# Patient Record
Sex: FEMALE | Race: WHITE | NOT HISPANIC OR LATINO
[De-identification: names, ages, dates, MRNs, and addresses within clinical notes are randomized per-mention and may not be internally consistent; named-entity substitution may affect disease eponyms.]

---

## 2020-03-06 ENCOUNTER — APPOINTMENT (OUTPATIENT)
Dept: CARDIOLOGY | Facility: CLINIC | Age: 60
End: 2020-03-06

## 2023-01-04 ENCOUNTER — APPOINTMENT (OUTPATIENT)
Dept: OBGYN | Facility: CLINIC | Age: 63
End: 2023-01-04
Payer: COMMERCIAL

## 2023-01-04 VITALS
BODY MASS INDEX: 47.91 KG/M2 | DIASTOLIC BLOOD PRESSURE: 78 MMHG | SYSTOLIC BLOOD PRESSURE: 126 MMHG | WEIGHT: 270.4 LBS | HEIGHT: 63 IN

## 2023-01-04 DIAGNOSIS — E11.9 TYPE 2 DIABETES MELLITUS W/OUT COMPLICATIONS: ICD-10-CM

## 2023-01-04 DIAGNOSIS — Z86.79 PERSONAL HISTORY OF OTHER DISEASES OF THE CIRCULATORY SYSTEM: ICD-10-CM

## 2023-01-04 DIAGNOSIS — Z78.9 OTHER SPECIFIED HEALTH STATUS: ICD-10-CM

## 2023-01-04 DIAGNOSIS — I10 ESSENTIAL (PRIMARY) HYPERTENSION: ICD-10-CM

## 2023-01-04 DIAGNOSIS — Z11.51 ENCOUNTER FOR SCREENING FOR HUMAN PAPILLOMAVIRUS (HPV): ICD-10-CM

## 2023-01-04 DIAGNOSIS — Z01.419 ENCOUNTER FOR GYNECOLOGICAL EXAMINATION (GENERAL) (ROUTINE) W/OUT ABNORMAL FINDINGS: ICD-10-CM

## 2023-01-04 DIAGNOSIS — Z12.39 ENCOUNTER FOR OTHER SCREENING FOR MALIGNANT NEOPLASM OF BREAST: ICD-10-CM

## 2023-01-04 DIAGNOSIS — K90.0 CELIAC DISEASE: ICD-10-CM

## 2023-01-04 DIAGNOSIS — Z82.49 FAMILY HISTORY OF ISCHEMIC HEART DISEASE AND OTHER DISEASES OF THE CIRCULATORY SYSTEM: ICD-10-CM

## 2023-01-04 DIAGNOSIS — N76.0 ACUTE VAGINITIS: ICD-10-CM

## 2023-01-04 DIAGNOSIS — Z83.3 FAMILY HISTORY OF DIABETES MELLITUS: ICD-10-CM

## 2023-01-04 DIAGNOSIS — Z12.4 ENCOUNTER FOR SCREENING FOR MALIGNANT NEOPLASM OF CERVIX: ICD-10-CM

## 2023-01-04 PROCEDURE — 99386 PREV VISIT NEW AGE 40-64: CPT

## 2023-01-04 PROCEDURE — 99202 OFFICE O/P NEW SF 15 MIN: CPT | Mod: 25

## 2023-01-04 RX ORDER — FLUCONAZOLE 150 MG/1
150 TABLET ORAL
Qty: 2 | Refills: 1 | Status: ACTIVE | COMMUNITY
Start: 2023-01-04 | End: 1900-01-01

## 2023-01-04 NOTE — REVIEW OF SYSTEMS
Regarding: hard time shallowing/not able to eat  ----- Message from Susie Tapia sent at 5/3/2020 12:09 PM CDT -----  Patient Name: Katy Diaz    Specialist or PCP Full Name: aakash henry    Pregnant (If Yes, how long?): no    Symptoms: hard time shallowing/not able to eat    Do you or any of your household members have the following symptoms:  Fever >100.4#F or >38.0#C: No    New or worsening cough, shortness of breath, or sore throat: No     New onset of nausea, vomiting or diarrhea: No    New onset of loss of taste or smell, chills, repeated shaking with chills, muscle pain, or headache: No    Have you or a household member tested positive for COVID-19 in the last 14 days?: No    Call Back #: [phone]    Call Center Account #: [id number]    Please update the Demographics section with the patients permanent resident address      [Patient Intake Form Reviewed] : Patient intake form was reviewed

## 2023-01-07 PROBLEM — Z86.79 HISTORY OF CHRONIC HYPERTENSION: Status: RESOLVED | Noted: 2023-01-07 | Resolved: 2023-01-07

## 2023-01-07 PROBLEM — K90.0 ADULT CELIAC DISEASE: Status: RESOLVED | Noted: 2023-01-07 | Resolved: 2023-01-07

## 2023-01-07 PROBLEM — E11.9 DIABETES MELLITUS: Status: ACTIVE | Noted: 2023-01-07

## 2023-01-07 PROBLEM — I10 CHRONIC HYPERTENSION: Status: ACTIVE | Noted: 2023-01-07

## 2023-01-07 NOTE — HISTORY OF PRESENT ILLNESS
[postmenopausal] : postmenopausal [N] : Patient does not use contraception [Y] : Positive pregnancy history [Menarche Age: ____] : age at menarche was [unfilled] [Post-Menopause, No Sxs] : post-menopausal, currently without symptoms [No] : Patient does not have concerns regarding sex [Currently Active] : currently active [Patient reported mammogram was normal] : Patient reported mammogram was normal [Patient reported PAP Smear was normal] : Patient reported PAP Smear was normal [Mammogramdate] : 2014 [PapSmeardate] : 2014 [LMPDate] : 2006 [PGHxTotal] : 3 [[facility] Services[facility]Full[facility]] : 2 [PGxPremature] : 1 [[facility] Center[facility]] : 3 [FreeTextEntry1] : 2006

## 2023-01-07 NOTE — PHYSICAL EXAM
[Appropriately responsive] : appropriately responsive [Alert] : alert [No Acute Distress] : no acute distress [Soft] : soft [Non-tender] : non-tender [Non-distended] : non-distended [No HSM] : No HSM [No Lesions] : no lesions [No Mass] : no mass [Oriented x3] : oriented x3 [Examination Of The Breasts] : a normal appearance [No Masses] : no breast masses were palpable [Labia Majora] : normal [Labia Minora] : normal [Discharge] : a  ~M vaginal discharge was present [Normal] : normal [Uterine Adnexae] : normal [Chaperone Present] : A chaperone was present in the examining room during all aspects of the physical examination [No Lymphadenopathy] : no lymphadenopathy [Vulvar Atrophy] : vulvar atrophy [Atrophy] : atrophy [FreeTextEntry1] : GEORGINA Verduzco  [FreeTextEntry3] : mobile thyroid, no masses, no nodules [FreeTextEntry6] : No cervical or axillary lymphadenopathy. [FreeTextEntry2] : vulvar yeast noted

## 2023-01-07 NOTE — END OF VISIT
[Time Spent: ___ minutes] : I have spent [unfilled] minutes of time on the encounter. [FreeTextEntry3] : I Meredith Knowles wrote this note acting  as a scribe for Dr. Vandaan Reardon MD on Jan 4th, 2023. I Dr. Vandana Reardon MD, ordering physician, have read and attest that all the information, medical decision making and discharge instructions within are true and accurate on 01/04/2023.

## 2023-01-15 LAB
CANDIDA VAG CYTO: NOT DETECTED
CYTOLOGY CVX/VAG DOC THIN PREP: NORMAL
G VAGINALIS+PREV SP MTYP VAG QL MICRO: NOT DETECTED
HPV HIGH+LOW RISK DNA PNL CVX: NOT DETECTED
T VAGINALIS VAG QL WET PREP: NOT DETECTED

## 2023-03-07 ENCOUNTER — NON-APPOINTMENT (OUTPATIENT)
Age: 63
End: 2023-03-07

## 2023-04-12 ENCOUNTER — NON-APPOINTMENT (OUTPATIENT)
Age: 63
End: 2023-04-12

## 2023-05-03 ENCOUNTER — NON-APPOINTMENT (OUTPATIENT)
Age: 63
End: 2023-05-03

## 2023-06-05 ENCOUNTER — NON-APPOINTMENT (OUTPATIENT)
Age: 63
End: 2023-06-05

## 2023-07-03 ENCOUNTER — NON-APPOINTMENT (OUTPATIENT)
Age: 63
End: 2023-07-03

## 2023-07-10 ENCOUNTER — APPOINTMENT (OUTPATIENT)
Dept: MAMMOGRAPHY | Facility: CLINIC | Age: 63
End: 2023-07-10
Payer: COMMERCIAL

## 2023-07-10 ENCOUNTER — OUTPATIENT (OUTPATIENT)
Dept: OUTPATIENT SERVICES | Facility: HOSPITAL | Age: 63
LOS: 1 days | End: 2023-07-10
Payer: COMMERCIAL

## 2023-07-10 ENCOUNTER — RESULT REVIEW (OUTPATIENT)
Age: 63
End: 2023-07-10

## 2023-07-10 DIAGNOSIS — Z12.31 ENCOUNTER FOR SCREENING MAMMOGRAM FOR MALIGNANT NEOPLASM OF BREAST: ICD-10-CM

## 2023-07-10 PROCEDURE — 77067 SCR MAMMO BI INCL CAD: CPT | Mod: 26

## 2023-07-10 PROCEDURE — 77063 BREAST TOMOSYNTHESIS BI: CPT

## 2023-07-10 PROCEDURE — 77067 SCR MAMMO BI INCL CAD: CPT

## 2023-07-10 PROCEDURE — 77063 BREAST TOMOSYNTHESIS BI: CPT | Mod: 26

## 2023-07-20 ENCOUNTER — APPOINTMENT (OUTPATIENT)
Dept: MAMMOGRAPHY | Facility: CLINIC | Age: 63
End: 2023-07-20

## 2023-07-20 ENCOUNTER — APPOINTMENT (OUTPATIENT)
Dept: ULTRASOUND IMAGING | Facility: CLINIC | Age: 63
End: 2023-07-20

## 2023-09-06 ENCOUNTER — APPOINTMENT (OUTPATIENT)
Dept: MAMMOGRAPHY | Facility: CLINIC | Age: 63
End: 2023-09-06

## 2023-09-06 ENCOUNTER — APPOINTMENT (OUTPATIENT)
Dept: ULTRASOUND IMAGING | Facility: CLINIC | Age: 63
End: 2023-09-06

## 2023-09-11 ENCOUNTER — NON-APPOINTMENT (OUTPATIENT)
Age: 63
End: 2023-09-11

## 2023-10-23 ENCOUNTER — RESULT REVIEW (OUTPATIENT)
Age: 63
End: 2023-10-23

## 2023-10-23 ENCOUNTER — OUTPATIENT (OUTPATIENT)
Dept: OUTPATIENT SERVICES | Facility: HOSPITAL | Age: 63
LOS: 1 days | End: 2023-10-23
Payer: COMMERCIAL

## 2023-10-23 ENCOUNTER — APPOINTMENT (OUTPATIENT)
Dept: MAMMOGRAPHY | Facility: CLINIC | Age: 63
End: 2023-10-23
Payer: COMMERCIAL

## 2023-10-23 ENCOUNTER — APPOINTMENT (OUTPATIENT)
Dept: ULTRASOUND IMAGING | Facility: CLINIC | Age: 63
End: 2023-10-23
Payer: COMMERCIAL

## 2023-10-23 DIAGNOSIS — R92.8 OTHER ABNORMAL AND INCONCLUSIVE FINDINGS ON DIAGNOSTIC IMAGING OF BREAST: ICD-10-CM

## 2023-10-23 PROCEDURE — 76641 ULTRASOUND BREAST COMPLETE: CPT | Mod: 26,LT

## 2023-10-23 PROCEDURE — G0279: CPT | Mod: 26

## 2023-10-23 PROCEDURE — 76642 ULTRASOUND BREAST LIMITED: CPT | Mod: 26,RT

## 2023-10-23 PROCEDURE — 77066 DX MAMMO INCL CAD BI: CPT | Mod: 26

## 2023-10-23 PROCEDURE — G0279: CPT

## 2023-10-23 PROCEDURE — 76642 ULTRASOUND BREAST LIMITED: CPT

## 2023-10-23 PROCEDURE — 77066 DX MAMMO INCL CAD BI: CPT

## 2023-10-23 PROCEDURE — 76641 ULTRASOUND BREAST COMPLETE: CPT

## 2024-07-01 ENCOUNTER — NON-APPOINTMENT (OUTPATIENT)
Age: 64
End: 2024-07-01

## 2024-07-01 ENCOUNTER — APPOINTMENT (OUTPATIENT)
Dept: OBGYN | Facility: CLINIC | Age: 64
End: 2024-07-01
Payer: COMMERCIAL

## 2024-07-01 VITALS
BODY MASS INDEX: 45.18 KG/M2 | DIASTOLIC BLOOD PRESSURE: 80 MMHG | WEIGHT: 255 LBS | HEIGHT: 63 IN | SYSTOLIC BLOOD PRESSURE: 128 MMHG

## 2024-07-01 DIAGNOSIS — N76.2 ACUTE VULVITIS: ICD-10-CM

## 2024-07-01 DIAGNOSIS — R82.90 UNSPECIFIED ABNORMAL FINDINGS IN URINE: ICD-10-CM

## 2024-07-01 LAB
APPEARANCE: CLEAR
BILIRUBIN URINE: NEGATIVE
BLOOD URINE: NEGATIVE
COLOR: YELLOW
GLUCOSE QUALITATIVE U: NEGATIVE
KETONES URINE: NEGATIVE
LEUKOCYTE ESTERASE URINE: NEGATIVE
NITRITE URINE: NEGATIVE
PH URINE: 5.5
PROTEIN URINE: NEGATIVE
SPECIFIC GRAVITY URINE: 1.01
UROBILINOGEN URINE: 0.2 (ref 0.2–?)

## 2024-07-01 PROCEDURE — 99213 OFFICE O/P EST LOW 20 MIN: CPT

## 2024-07-01 RX ORDER — CLOTRIMAZOLE AND BETAMETHASONE DIPROPIONATE 10; .5 MG/G; MG/G
1-0.05 CREAM TOPICAL TWICE DAILY
Qty: 1 | Refills: 0 | Status: ACTIVE | COMMUNITY
Start: 2024-07-01 | End: 1900-01-01

## 2024-07-01 RX ORDER — FLUCONAZOLE 150 MG/1
150 TABLET ORAL
Qty: 4 | Refills: 1 | Status: ACTIVE | COMMUNITY
Start: 2024-07-01 | End: 1900-01-01

## 2024-07-06 LAB
BACTERIA UR CULT: NORMAL
CANDIDA VAG CYTO: NOT DETECTED
G VAGINALIS+PREV SP MTYP VAG QL MICRO: NOT DETECTED
T VAGINALIS VAG QL WET PREP: NOT DETECTED

## 2024-07-22 ENCOUNTER — APPOINTMENT (OUTPATIENT)
Dept: OBGYN | Facility: CLINIC | Age: 64
End: 2024-07-22

## 2024-08-12 ENCOUNTER — APPOINTMENT (OUTPATIENT)
Dept: OBGYN | Facility: CLINIC | Age: 64
End: 2024-08-12

## 2024-10-07 ENCOUNTER — OFFICE (OUTPATIENT)
Dept: URBAN - METROPOLITAN AREA CLINIC 12 | Facility: CLINIC | Age: 64
Setting detail: OPHTHALMOLOGY
End: 2024-10-07
Payer: COMMERCIAL

## 2024-10-07 DIAGNOSIS — H90.3: ICD-10-CM

## 2024-10-07 PROCEDURE — 92557 COMPREHENSIVE HEARING TEST: CPT

## 2024-10-07 PROCEDURE — 92567 TYMPANOMETRY: CPT

## 2024-10-07 PROCEDURE — V5261 HEARING AID, DIGIT, BIN, BTE: HCPCS

## 2024-11-26 ENCOUNTER — OFFICE (OUTPATIENT)
Dept: URBAN - METROPOLITAN AREA CLINIC 12 | Facility: CLINIC | Age: 64
Setting detail: OPHTHALMOLOGY
End: 2024-11-26

## 2024-11-26 DIAGNOSIS — H90.3: ICD-10-CM

## 2024-11-26 PROCEDURE — HAD HEARING AID DISPENSE

## 2024-12-03 ENCOUNTER — OFFICE (OUTPATIENT)
Dept: URBAN - METROPOLITAN AREA CLINIC 12 | Facility: CLINIC | Age: 64
Setting detail: OPHTHALMOLOGY
End: 2024-12-03

## 2024-12-03 DIAGNOSIS — H90.3: ICD-10-CM

## 2024-12-03 PROCEDURE — 92593 HEARING AID CHECK; BINAURAL: CPT

## 2024-12-18 ENCOUNTER — OFFICE (OUTPATIENT)
Dept: URBAN - METROPOLITAN AREA CLINIC 12 | Facility: CLINIC | Age: 64
Setting detail: OPHTHALMOLOGY
End: 2024-12-18

## 2024-12-18 DIAGNOSIS — H90.3: ICD-10-CM

## 2024-12-18 PROCEDURE — CANCEL CANCEL

## 2025-01-06 ENCOUNTER — OFFICE (OUTPATIENT)
Dept: URBAN - METROPOLITAN AREA CLINIC 12 | Facility: CLINIC | Age: 65
Setting detail: OPHTHALMOLOGY
End: 2025-01-06

## 2025-01-06 DIAGNOSIS — H90.3: ICD-10-CM

## 2025-01-06 PROCEDURE — NO SHOW FE NO SHOW FEE

## 2025-01-23 ENCOUNTER — OFFICE (OUTPATIENT)
Dept: URBAN - METROPOLITAN AREA CLINIC 12 | Facility: CLINIC | Age: 65
Setting detail: OPHTHALMOLOGY
End: 2025-01-23

## 2025-01-23 ENCOUNTER — OFFICE (OUTPATIENT)
Dept: URBAN - METROPOLITAN AREA CLINIC 38 | Facility: CLINIC | Age: 65
Setting detail: OPHTHALMOLOGY
End: 2025-01-23
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
## 2025-01-23 DIAGNOSIS — H90.3: ICD-10-CM

## 2025-01-23 PROCEDURE — R/S RESCHEDULED

## 2025-01-23 PROCEDURE — NO SHOW FE NO SHOW FEE

## 2025-03-05 ENCOUNTER — OFFICE (OUTPATIENT)
Dept: URBAN - METROPOLITAN AREA CLINIC 12 | Facility: CLINIC | Age: 65
Setting detail: OPHTHALMOLOGY
End: 2025-03-05

## 2025-03-05 DIAGNOSIS — H90.3: ICD-10-CM

## 2025-03-05 PROCEDURE — 92593 HEARING AID CHECK; BINAURAL: CPT

## 2025-03-31 ENCOUNTER — APPOINTMENT (OUTPATIENT)
Dept: OBGYN | Facility: CLINIC | Age: 65
End: 2025-03-31
Payer: COMMERCIAL

## 2025-03-31 VITALS
SYSTOLIC BLOOD PRESSURE: 122 MMHG | HEIGHT: 63 IN | WEIGHT: 245 LBS | BODY MASS INDEX: 43.41 KG/M2 | DIASTOLIC BLOOD PRESSURE: 80 MMHG

## 2025-03-31 DIAGNOSIS — N90.89 OTHER SPECIFIED NONINFLAMMATORY DISORDERS OF VULVA AND PERINEUM: ICD-10-CM

## 2025-03-31 DIAGNOSIS — Z12.4 ENCOUNTER FOR SCREENING FOR MALIGNANT NEOPLASM OF CERVIX: ICD-10-CM

## 2025-03-31 DIAGNOSIS — Z01.419 ENCOUNTER FOR GYNECOLOGICAL EXAMINATION (GENERAL) (ROUTINE) W/OUT ABNORMAL FINDINGS: ICD-10-CM

## 2025-03-31 DIAGNOSIS — R73.9 HYPERGLYCEMIA, UNSPECIFIED: ICD-10-CM

## 2025-03-31 DIAGNOSIS — Z12.39 ENCOUNTER FOR OTHER SCREENING FOR MALIGNANT NEOPLASM OF BREAST: ICD-10-CM

## 2025-03-31 DIAGNOSIS — N76.2 ACUTE VULVITIS: ICD-10-CM

## 2025-03-31 DIAGNOSIS — L29.2 PRURITUS VULVAE: ICD-10-CM

## 2025-03-31 DIAGNOSIS — Z91.199 PATIENT'S NONCOMPLIANCE WITH OTHER MEDICAL TREATMENT AND REGIMEN DUE TO UNSPECIFIED REASON: ICD-10-CM

## 2025-03-31 DIAGNOSIS — R82.90 UNSPECIFIED ABNORMAL FINDINGS IN URINE: ICD-10-CM

## 2025-03-31 LAB
APPEARANCE: CLEAR
BILIRUBIN URINE: NEGATIVE
BLOOD URINE: NEGATIVE
COLOR: YELLOW
GLUCOSE BLDC GLUCOMTR-MCNC: 549
GLUCOSE QUALITATIVE U: >=1000
KETONES URINE: NEGATIVE
LEUKOCYTE ESTERASE URINE: NEGATIVE
NITRITE URINE: NEGATIVE
PH URINE: 5.5
PROTEIN URINE: NEGATIVE
SPECIFIC GRAVITY URINE: <=1.005
UROBILINOGEN URINE: 0.2 (ref 0.2–?)

## 2025-03-31 PROCEDURE — 99459 PELVIC EXAMINATION: CPT

## 2025-03-31 PROCEDURE — 99214 OFFICE O/P EST MOD 30 MIN: CPT

## 2025-03-31 PROCEDURE — 82962 GLUCOSE BLOOD TEST: CPT

## 2025-03-31 RX ORDER — LOSARTAN POTASSIUM 100 MG/1
100 TABLET, FILM COATED ORAL
Refills: 0 | Status: ACTIVE | COMMUNITY

## 2025-03-31 RX ORDER — FLUCONAZOLE 150 MG/1
150 TABLET ORAL
Qty: 3 | Refills: 3 | Status: ACTIVE | COMMUNITY
Start: 2025-03-31 | End: 1900-01-01

## 2025-03-31 RX ORDER — CARVEDILOL 3.12 MG/1
3.12 TABLET, FILM COATED ORAL
Refills: 0 | Status: ACTIVE | COMMUNITY

## 2025-03-31 RX ORDER — MULTIVITAMIN/IRON/FOLIC ACID 18MG-0.4MG
TABLET ORAL
Refills: 0 | Status: ACTIVE | COMMUNITY

## 2025-03-31 RX ORDER — CLOTRIMAZOLE AND BETAMETHASONE DIPROPIONATE 10; .5 MG/G; MG/G
1-0.05 CREAM TOPICAL TWICE DAILY
Qty: 1 | Refills: 3 | Status: ACTIVE | COMMUNITY
Start: 2025-03-31 | End: 1900-01-01

## 2025-03-31 RX ORDER — ATORVASTATIN CALCIUM 80 MG/1
80 TABLET, FILM COATED ORAL
Refills: 0 | Status: ACTIVE | COMMUNITY

## 2025-03-31 RX ORDER — ASPIRIN 81 MG
81 TABLET,CHEWABLE ORAL
Refills: 0 | Status: ACTIVE | COMMUNITY

## 2025-06-10 ENCOUNTER — OFFICE (OUTPATIENT)
Dept: URBAN - METROPOLITAN AREA CLINIC 12 | Facility: CLINIC | Age: 65
Setting detail: OPHTHALMOLOGY
End: 2025-06-10

## 2025-06-10 DIAGNOSIS — H90.3: ICD-10-CM

## 2025-06-10 PROCEDURE — CANCEL CANCEL

## 2025-07-14 ENCOUNTER — OFFICE (OUTPATIENT)
Dept: URBAN - METROPOLITAN AREA CLINIC 12 | Facility: CLINIC | Age: 65
Setting detail: OPHTHALMOLOGY
End: 2025-07-14

## 2025-07-14 DIAGNOSIS — H90.3: ICD-10-CM

## 2025-07-14 PROCEDURE — 92593 HEARING AID CHECK; BINAURAL: CPT
